# Patient Record
Sex: FEMALE | Race: BLACK OR AFRICAN AMERICAN | NOT HISPANIC OR LATINO | URBAN - METROPOLITAN AREA
[De-identification: names, ages, dates, MRNs, and addresses within clinical notes are randomized per-mention and may not be internally consistent; named-entity substitution may affect disease eponyms.]

---

## 2018-04-18 ENCOUNTER — RECORDS - HEALTHEAST (OUTPATIENT)
Dept: LAB | Facility: CLINIC | Age: 68
End: 2018-04-18

## 2018-04-18 LAB
ERYTHROCYTE [DISTWIDTH] IN BLOOD BY AUTOMATED COUNT: 14.9 % (ref 11–14.5)
HCT VFR BLD AUTO: 38.5 % (ref 35–47)
HGB BLD-MCNC: 12.8 G/DL (ref 12–16)
MCH RBC QN AUTO: 34.8 PG (ref 27–34)
MCHC RBC AUTO-ENTMCNC: 33.2 G/DL (ref 32–36)
MCV RBC AUTO: 105 FL (ref 80–100)
PLATELET # BLD AUTO: 97 THOU/UL (ref 140–440)
PMV BLD AUTO: 11.6 FL (ref 8.5–12.5)
RBC # BLD AUTO: 3.68 MILL/UL (ref 3.8–5.4)
WBC: 6.3 THOU/UL (ref 4–11)

## 2018-05-07 ENCOUNTER — AMBULATORY - HEALTHEAST (OUTPATIENT)
Dept: ADMINISTRATIVE | Facility: CLINIC | Age: 68
End: 2018-05-07

## 2018-05-07 ENCOUNTER — RECORDS - HEALTHEAST (OUTPATIENT)
Dept: LAB | Facility: CLINIC | Age: 68
End: 2018-05-07

## 2018-05-07 RX ORDER — DEXAMETHASONE 1.5 MG/1
2 TABLET ORAL DAILY
Status: SHIPPED | COMMUNITY
Start: 2018-05-07

## 2018-05-07 RX ORDER — FAMOTIDINE 20 MG/1
20 TABLET, FILM COATED ORAL 2 TIMES DAILY
Status: SHIPPED | COMMUNITY
Start: 2018-05-07

## 2018-05-07 RX ORDER — AMLODIPINE BESYLATE 5 MG/1
5 TABLET ORAL DAILY
Status: SHIPPED | COMMUNITY
Start: 2018-05-07

## 2018-05-07 RX ORDER — ACETAMINOPHEN 325 MG/1
650 TABLET ORAL EVERY 6 HOURS PRN
Status: SHIPPED | COMMUNITY
Start: 2018-05-07

## 2018-05-07 RX ORDER — INSULIN GLARGINE 100 [IU]/ML
16 INJECTION, SOLUTION SUBCUTANEOUS AT BEDTIME
Status: SHIPPED | COMMUNITY
Start: 2018-05-07

## 2018-05-07 RX ORDER — ATORVASTATIN CALCIUM 20 MG/1
20 TABLET, FILM COATED ORAL AT BEDTIME
Status: SHIPPED | COMMUNITY
Start: 2018-05-07

## 2018-05-07 RX ORDER — LEVETIRACETAM 750 MG/1
750 TABLET ORAL 2 TIMES DAILY
Status: SHIPPED | COMMUNITY
Start: 2018-05-07

## 2018-05-07 RX ORDER — LIDOCAINE/PRILOCAINE 2.5 %-2.5%
CREAM (GRAM) TOPICAL PRN
Status: SHIPPED | COMMUNITY
Start: 2018-05-07

## 2018-05-07 RX ORDER — AMOXICILLIN 250 MG
1 CAPSULE ORAL 2 TIMES DAILY
Status: SHIPPED | COMMUNITY
Start: 2018-05-07

## 2018-05-07 RX ORDER — LANOLIN ALCOHOL/MO/W.PET/CERES
3 CREAM (GRAM) TOPICAL AT BEDTIME
Status: SHIPPED | COMMUNITY
Start: 2018-05-07

## 2018-05-08 ENCOUNTER — OFFICE VISIT - HEALTHEAST (OUTPATIENT)
Dept: GERIATRICS | Facility: CLINIC | Age: 68
End: 2018-05-08

## 2018-05-08 DIAGNOSIS — I48.91 A-FIB (H): ICD-10-CM

## 2018-05-08 DIAGNOSIS — E11.9 DM (DIABETES MELLITUS) (H): ICD-10-CM

## 2018-05-08 DIAGNOSIS — I10 HTN (HYPERTENSION): ICD-10-CM

## 2018-05-08 DIAGNOSIS — C71.9 GBM (GLIOBLASTOMA MULTIFORME) (H): ICD-10-CM

## 2018-05-08 DIAGNOSIS — E46 PROTEIN CALORIE MALNUTRITION (H): ICD-10-CM

## 2018-05-08 DIAGNOSIS — I26.99 MULTIPLE PULMONARY EMBOLI (H): ICD-10-CM

## 2018-05-08 DIAGNOSIS — I26.99 PULMONARY INFARCT (H): ICD-10-CM

## 2018-05-08 DIAGNOSIS — E83.42 HYPOMAGNESEMIA: ICD-10-CM

## 2018-05-08 DIAGNOSIS — E87.6 HYPOKALEMIA: ICD-10-CM

## 2018-05-08 DIAGNOSIS — D69.6 THROMBOCYTOPENIA (H): ICD-10-CM

## 2018-05-08 LAB
ALBUMIN SERPL-MCNC: 1.9 G/DL (ref 3.5–5)
ALP SERPL-CCNC: 103 U/L (ref 45–120)
ALT SERPL W P-5'-P-CCNC: 36 U/L (ref 0–45)
ANION GAP SERPL CALCULATED.3IONS-SCNC: 7 MMOL/L (ref 5–18)
AST SERPL W P-5'-P-CCNC: 12 U/L (ref 0–40)
BILIRUB DIRECT SERPL-MCNC: 0.3 MG/DL
BILIRUB SERPL-MCNC: 0.6 MG/DL (ref 0–1)
BUN SERPL-MCNC: 11 MG/DL (ref 8–22)
CALCIUM SERPL-MCNC: 8.4 MG/DL (ref 8.5–10.5)
CHLORIDE BLD-SCNC: 107 MMOL/L (ref 98–107)
CO2 SERPL-SCNC: 27 MMOL/L (ref 22–31)
CREAT SERPL-MCNC: 0.46 MG/DL (ref 0.6–1.1)
ERYTHROCYTE [DISTWIDTH] IN BLOOD BY AUTOMATED COUNT: 16 % (ref 11–14.5)
GFR SERPL CREATININE-BSD FRML MDRD: >60 ML/MIN/1.73M2
GLUCOSE BLD-MCNC: 140 MG/DL (ref 70–125)
HCT VFR BLD AUTO: 33.6 % (ref 35–47)
HGB BLD-MCNC: 10.8 G/DL (ref 12–16)
MAGNESIUM SERPL-MCNC: 1.3 MG/DL (ref 1.8–2.6)
MCH RBC QN AUTO: 34.7 PG (ref 27–34)
MCHC RBC AUTO-ENTMCNC: 32.1 G/DL (ref 32–36)
MCV RBC AUTO: 108 FL (ref 80–100)
PLATELET # BLD AUTO: 136 THOU/UL (ref 140–440)
PMV BLD AUTO: 11.3 FL (ref 8.5–12.5)
POTASSIUM BLD-SCNC: 3.2 MMOL/L (ref 3.5–5)
PROT SERPL-MCNC: 4.8 G/DL (ref 6–8)
RBC # BLD AUTO: 3.11 MILL/UL (ref 3.8–5.4)
SODIUM SERPL-SCNC: 141 MMOL/L (ref 136–145)
WBC: 9.1 THOU/UL (ref 4–11)

## 2018-05-09 ENCOUNTER — RECORDS - HEALTHEAST (OUTPATIENT)
Dept: LAB | Facility: CLINIC | Age: 68
End: 2018-05-09

## 2018-05-10 LAB
ANION GAP SERPL CALCULATED.3IONS-SCNC: 6 MMOL/L (ref 5–18)
BUN SERPL-MCNC: 9 MG/DL (ref 8–22)
CALCIUM SERPL-MCNC: 8.3 MG/DL (ref 8.5–10.5)
CHLORIDE BLD-SCNC: 109 MMOL/L (ref 98–107)
CO2 SERPL-SCNC: 28 MMOL/L (ref 22–31)
CREAT SERPL-MCNC: 0.4 MG/DL (ref 0.6–1.1)
GFR SERPL CREATININE-BSD FRML MDRD: >60 ML/MIN/1.73M2
GLUCOSE BLD-MCNC: 67 MG/DL (ref 70–125)
MAGNESIUM SERPL-MCNC: 1.4 MG/DL (ref 1.8–2.6)
POTASSIUM BLD-SCNC: 3.5 MMOL/L (ref 3.5–5)
SODIUM SERPL-SCNC: 143 MMOL/L (ref 136–145)
UFH PPP CHRO-ACNC: 0.18 IU/ML (ref 0.3–0.7)

## 2018-05-11 ENCOUNTER — OFFICE VISIT - HEALTHEAST (OUTPATIENT)
Dept: GERIATRICS | Facility: CLINIC | Age: 68
End: 2018-05-11

## 2018-05-11 DIAGNOSIS — I26.99 MULTIPLE PULMONARY EMBOLI (H): ICD-10-CM

## 2018-05-11 DIAGNOSIS — I48.91 A-FIB (H): ICD-10-CM

## 2018-05-11 DIAGNOSIS — D69.6 THROMBOCYTOPENIA (H): ICD-10-CM

## 2018-05-11 DIAGNOSIS — C71.9 GBM (GLIOBLASTOMA MULTIFORME) (H): ICD-10-CM

## 2018-05-11 DIAGNOSIS — I10 HTN (HYPERTENSION): ICD-10-CM

## 2018-05-11 DIAGNOSIS — E83.42 HYPOMAGNESEMIA: ICD-10-CM

## 2018-05-11 DIAGNOSIS — I26.99 PULMONARY INFARCT (H): ICD-10-CM

## 2018-05-15 ENCOUNTER — OFFICE VISIT - HEALTHEAST (OUTPATIENT)
Dept: GERIATRICS | Facility: CLINIC | Age: 68
End: 2018-05-15

## 2018-05-15 DIAGNOSIS — I26.99 MULTIPLE PULMONARY EMBOLI (H): ICD-10-CM

## 2018-05-15 DIAGNOSIS — C71.9 GBM (GLIOBLASTOMA MULTIFORME) (H): ICD-10-CM

## 2018-05-15 DIAGNOSIS — I26.99 PULMONARY INFARCT (H): ICD-10-CM

## 2018-05-15 DIAGNOSIS — I48.91 A-FIB (H): ICD-10-CM

## 2018-05-16 ENCOUNTER — RECORDS - HEALTHEAST (OUTPATIENT)
Dept: LAB | Facility: CLINIC | Age: 68
End: 2018-05-16

## 2018-05-16 LAB
ERYTHROCYTE [DISTWIDTH] IN BLOOD BY AUTOMATED COUNT: 16.5 % (ref 11–14.5)
HCT VFR BLD AUTO: 33.6 % (ref 35–47)
HGB BLD-MCNC: 10.5 G/DL (ref 12–16)
MCH RBC QN AUTO: 34.9 PG (ref 27–34)
MCHC RBC AUTO-ENTMCNC: 31.3 G/DL (ref 32–36)
MCV RBC AUTO: 112 FL (ref 80–100)
PLATELET # BLD AUTO: 220 THOU/UL (ref 140–440)
PMV BLD AUTO: 10 FL (ref 8.5–12.5)
RBC # BLD AUTO: 3.01 MILL/UL (ref 3.8–5.4)
WBC: 8.4 THOU/UL (ref 4–11)

## 2018-05-17 ENCOUNTER — OFFICE VISIT - HEALTHEAST (OUTPATIENT)
Dept: GERIATRICS | Facility: CLINIC | Age: 68
End: 2018-05-17

## 2018-05-17 DIAGNOSIS — I10 HTN (HYPERTENSION): ICD-10-CM

## 2018-05-17 DIAGNOSIS — I26.99 MULTIPLE PULMONARY EMBOLI (H): ICD-10-CM

## 2018-05-17 DIAGNOSIS — D69.6 THROMBOCYTOPENIA (H): ICD-10-CM

## 2018-05-17 DIAGNOSIS — C71.9 GBM (GLIOBLASTOMA MULTIFORME) (H): ICD-10-CM

## 2018-05-21 ENCOUNTER — RECORDS - HEALTHEAST (OUTPATIENT)
Dept: LAB | Facility: CLINIC | Age: 68
End: 2018-05-21

## 2018-05-21 LAB — LMWH PPP CHRO-ACNC: 0.9 IU/ML (ref 0.4–1.1)

## 2018-05-22 ENCOUNTER — OFFICE VISIT - HEALTHEAST (OUTPATIENT)
Dept: GERIATRICS | Facility: CLINIC | Age: 68
End: 2018-05-22

## 2018-05-22 DIAGNOSIS — E09.9 STEROID-INDUCED DIABETES (H): ICD-10-CM

## 2018-05-22 DIAGNOSIS — T38.0X5A STEROID-INDUCED DIABETES (H): ICD-10-CM

## 2018-05-22 DIAGNOSIS — I26.99 MULTIPLE PULMONARY EMBOLI (H): ICD-10-CM

## 2018-05-22 DIAGNOSIS — C71.9 GBM (GLIOBLASTOMA MULTIFORME) (H): ICD-10-CM

## 2018-05-22 DIAGNOSIS — I10 HTN (HYPERTENSION): ICD-10-CM

## 2018-05-25 ENCOUNTER — RECORDS - HEALTHEAST (OUTPATIENT)
Dept: LAB | Facility: CLINIC | Age: 68
End: 2018-05-25

## 2018-05-25 ENCOUNTER — OFFICE VISIT - HEALTHEAST (OUTPATIENT)
Dept: GERIATRICS | Facility: CLINIC | Age: 68
End: 2018-05-25

## 2018-05-25 DIAGNOSIS — R00.0 TACHYCARDIA: ICD-10-CM

## 2018-05-25 DIAGNOSIS — D69.6 THROMBOCYTOPENIA (H): ICD-10-CM

## 2018-05-25 DIAGNOSIS — C71.9 GBM (GLIOBLASTOMA MULTIFORME) (H): ICD-10-CM

## 2018-05-25 DIAGNOSIS — I26.99 MULTIPLE PULMONARY EMBOLI (H): ICD-10-CM

## 2018-05-25 DIAGNOSIS — I10 HTN (HYPERTENSION): ICD-10-CM

## 2018-05-25 LAB
ALBUMIN UR-MCNC: NEGATIVE MG/DL
ANION GAP SERPL CALCULATED.3IONS-SCNC: 9 MMOL/L (ref 5–18)
APPEARANCE UR: CLEAR
BACTERIA #/AREA URNS HPF: ABNORMAL HPF
BILIRUB UR QL STRIP: NEGATIVE
BUN SERPL-MCNC: 7 MG/DL (ref 8–22)
CALCIUM SERPL-MCNC: 9.2 MG/DL (ref 8.5–10.5)
CAOX CRY #/AREA URNS HPF: PRESENT /[HPF]
CHLORIDE BLD-SCNC: 111 MMOL/L (ref 98–107)
CO2 SERPL-SCNC: 22 MMOL/L (ref 22–31)
COLOR UR AUTO: YELLOW
CREAT SERPL-MCNC: 0.56 MG/DL (ref 0.6–1.1)
ERYTHROCYTE [DISTWIDTH] IN BLOOD BY AUTOMATED COUNT: 16.1 % (ref 11–14.5)
GFR SERPL CREATININE-BSD FRML MDRD: >60 ML/MIN/1.73M2
GLUCOSE BLD-MCNC: 106 MG/DL (ref 70–125)
GLUCOSE UR STRIP-MCNC: NEGATIVE MG/DL
HCT VFR BLD AUTO: 36.4 % (ref 35–47)
HGB BLD-MCNC: 11.7 G/DL (ref 12–16)
HGB UR QL STRIP: NEGATIVE
KETONES UR STRIP-MCNC: NEGATIVE MG/DL
LEUKOCYTE ESTERASE UR QL STRIP: ABNORMAL
MCH RBC QN AUTO: 34.8 PG (ref 27–34)
MCHC RBC AUTO-ENTMCNC: 32.1 G/DL (ref 32–36)
MCV RBC AUTO: 108 FL (ref 80–100)
MUCOUS THREADS #/AREA URNS LPF: ABNORMAL LPF
NITRATE UR QL: NEGATIVE
PH UR STRIP: 5 [PH] (ref 4.5–8)
PLATELET # BLD AUTO: 194 THOU/UL (ref 140–440)
PMV BLD AUTO: 10 FL (ref 8.5–12.5)
POTASSIUM BLD-SCNC: 3.7 MMOL/L (ref 3.5–5)
RBC # BLD AUTO: 3.36 MILL/UL (ref 3.8–5.4)
RBC #/AREA URNS AUTO: ABNORMAL HPF
SODIUM SERPL-SCNC: 142 MMOL/L (ref 136–145)
SP GR UR STRIP: 1.01 (ref 1–1.03)
SQUAMOUS #/AREA URNS AUTO: ABNORMAL LPF
UROBILINOGEN UR STRIP-ACNC: ABNORMAL
WBC #/AREA URNS AUTO: ABNORMAL HPF
WBC CLUMPS #/AREA URNS HPF: PRESENT /[HPF]
WBC: 7.9 THOU/UL (ref 4–11)
YEAST #/AREA URNS HPF: ABNORMAL HPF

## 2018-05-28 LAB
BACTERIA SPEC CULT: ABNORMAL
BACTERIA SPEC CULT: ABNORMAL

## 2018-05-30 ENCOUNTER — OFFICE VISIT - HEALTHEAST (OUTPATIENT)
Dept: GERIATRICS | Facility: CLINIC | Age: 68
End: 2018-05-30

## 2018-05-30 DIAGNOSIS — C71.9 GBM (GLIOBLASTOMA MULTIFORME) (H): ICD-10-CM

## 2018-05-30 DIAGNOSIS — I48.91 A-FIB (H): ICD-10-CM

## 2018-05-30 DIAGNOSIS — I10 HTN (HYPERTENSION): ICD-10-CM

## 2018-05-30 DIAGNOSIS — I26.99 MULTIPLE PULMONARY EMBOLI (H): ICD-10-CM

## 2018-05-30 DIAGNOSIS — R00.0 TACHYCARDIA: ICD-10-CM

## 2018-05-30 DIAGNOSIS — T38.0X5A STEROID-INDUCED DIABETES (H): ICD-10-CM

## 2018-05-30 DIAGNOSIS — E09.9 STEROID-INDUCED DIABETES (H): ICD-10-CM

## 2018-05-30 DIAGNOSIS — N39.0 URINARY TRACT INFECTION: ICD-10-CM

## 2018-06-01 ENCOUNTER — OFFICE VISIT - HEALTHEAST (OUTPATIENT)
Dept: GERIATRICS | Facility: CLINIC | Age: 68
End: 2018-06-01

## 2018-06-01 DIAGNOSIS — I10 HYPERTENSION: ICD-10-CM

## 2018-06-01 DIAGNOSIS — R53.1 GENERAL WEAKNESS: ICD-10-CM

## 2018-06-01 DIAGNOSIS — K21.9 GERD (GASTROESOPHAGEAL REFLUX DISEASE): ICD-10-CM

## 2018-06-01 DIAGNOSIS — E11.9 DIABETES MELLITUS (H): ICD-10-CM

## 2018-06-01 DIAGNOSIS — E78.5 HYPERLIPIDEMIA: ICD-10-CM

## 2018-06-01 DIAGNOSIS — C71.9 GLIOBLASTOMA (H): ICD-10-CM

## 2018-06-05 ENCOUNTER — RECORDS - HEALTHEAST (OUTPATIENT)
Dept: LAB | Facility: CLINIC | Age: 68
End: 2018-06-05

## 2018-06-05 ENCOUNTER — OFFICE VISIT - HEALTHEAST (OUTPATIENT)
Dept: GERIATRICS | Facility: CLINIC | Age: 68
End: 2018-06-05

## 2018-06-05 DIAGNOSIS — E11.9 DIABETES MELLITUS (H): ICD-10-CM

## 2018-06-05 DIAGNOSIS — R53.81 PHYSICAL DEBILITY: ICD-10-CM

## 2018-06-05 DIAGNOSIS — I10 HYPERTENSION: ICD-10-CM

## 2018-06-05 DIAGNOSIS — C71.9 GLIOBLASTOMA (H): ICD-10-CM

## 2018-06-05 LAB
BASOPHILS # BLD AUTO: 0 THOU/UL (ref 0–0.2)
BASOPHILS NFR BLD AUTO: 0 % (ref 0–2)
EOSINOPHIL # BLD AUTO: 0 THOU/UL (ref 0–0.4)
EOSINOPHIL NFR BLD AUTO: 0 % (ref 0–6)
ERYTHROCYTE [DISTWIDTH] IN BLOOD BY AUTOMATED COUNT: 14.8 % (ref 11–14.5)
HCT VFR BLD AUTO: 32.6 % (ref 35–47)
HGB BLD-MCNC: 10.9 G/DL (ref 12–16)
LYMPHOCYTES # BLD AUTO: 2.1 THOU/UL (ref 0.8–4.4)
LYMPHOCYTES NFR BLD AUTO: 24 % (ref 20–40)
MCH RBC QN AUTO: 35 PG (ref 27–34)
MCHC RBC AUTO-ENTMCNC: 33.4 G/DL (ref 32–36)
MCV RBC AUTO: 105 FL (ref 80–100)
MONOCYTES # BLD AUTO: 1.3 THOU/UL (ref 0–0.9)
MONOCYTES NFR BLD AUTO: 15 % (ref 2–10)
NEUTROPHILS # BLD AUTO: 5.3 THOU/UL (ref 2–7.7)
NEUTROPHILS NFR BLD AUTO: 61 % (ref 50–70)
PLATELET # BLD AUTO: 296 THOU/UL (ref 140–440)
PMV BLD AUTO: 9.5 FL (ref 8.5–12.5)
RBC # BLD AUTO: 3.11 MILL/UL (ref 3.8–5.4)
WBC: 8.9 THOU/UL (ref 4–11)

## 2018-06-06 ENCOUNTER — RECORDS - HEALTHEAST (OUTPATIENT)
Dept: LAB | Facility: CLINIC | Age: 68
End: 2018-06-06

## 2018-06-06 LAB
ALBUMIN UR-MCNC: ABNORMAL MG/DL
APPEARANCE UR: CLEAR
BACTERIA #/AREA URNS HPF: ABNORMAL HPF
BILIRUB UR QL STRIP: NEGATIVE
COLOR UR AUTO: YELLOW
GLUCOSE UR STRIP-MCNC: NEGATIVE MG/DL
HGB UR QL STRIP: NEGATIVE
KETONES UR STRIP-MCNC: ABNORMAL MG/DL
LEUKOCYTE ESTERASE UR QL STRIP: ABNORMAL
MUCOUS THREADS #/AREA URNS LPF: ABNORMAL LPF
NITRATE UR QL: NEGATIVE
PH UR STRIP: 6 [PH] (ref 4.5–8)
RBC #/AREA URNS AUTO: ABNORMAL HPF
SP GR UR STRIP: 1.03 (ref 1–1.03)
SQUAMOUS #/AREA URNS AUTO: ABNORMAL LPF
UROBILINOGEN UR STRIP-ACNC: ABNORMAL
WBC #/AREA URNS AUTO: ABNORMAL HPF
YEAST #/AREA URNS HPF: ABNORMAL HPF

## 2018-06-08 ENCOUNTER — OFFICE VISIT - HEALTHEAST (OUTPATIENT)
Dept: GERIATRICS | Facility: CLINIC | Age: 68
End: 2018-06-08

## 2018-06-08 DIAGNOSIS — I10 HTN (HYPERTENSION): ICD-10-CM

## 2018-06-08 DIAGNOSIS — I48.91 A-FIB (H): ICD-10-CM

## 2018-06-08 DIAGNOSIS — E11.9 DM (DIABETES MELLITUS) (H): ICD-10-CM

## 2018-06-08 DIAGNOSIS — I26.99 PULMONARY INFARCT (H): ICD-10-CM

## 2018-06-08 LAB — BACTERIA SPEC CULT: ABNORMAL

## 2018-06-11 ENCOUNTER — AMBULATORY - HEALTHEAST (OUTPATIENT)
Dept: GERIATRICS | Facility: CLINIC | Age: 68
End: 2018-06-11

## 2021-06-16 PROBLEM — E11.9 DIABETES MELLITUS (H): Status: ACTIVE | Noted: 2018-06-01

## 2021-06-16 PROBLEM — E78.5 HYPERLIPIDEMIA: Status: ACTIVE | Noted: 2018-06-01

## 2021-06-16 PROBLEM — R53.1 GENERAL WEAKNESS: Status: ACTIVE | Noted: 2018-06-01

## 2021-06-16 PROBLEM — I10 HYPERTENSION: Status: ACTIVE | Noted: 2018-06-01

## 2021-06-16 PROBLEM — K21.9 GERD (GASTROESOPHAGEAL REFLUX DISEASE): Status: ACTIVE | Noted: 2018-06-01

## 2021-06-16 PROBLEM — C71.9 GLIOBLASTOMA (H): Status: ACTIVE | Noted: 2018-06-01

## 2021-06-17 NOTE — PROGRESS NOTES
[facility]    Facility:   [facility] [[id number]]   Code Status: FULL CODE      CHIEF COMPLAINT/REASON FOR VISIT:  Chief Complaint   Patient presents with     Review Of Multiple Medical Conditions       HISTORY:      HPI: Florence is a 67 y.o. female who I am seeing in follow-up of her multiple medical problems.  She is very nice 67-year-old female unfortunate individual who has stage IV GBM.  She has had multiple recurrences and has had resection of these recurrences the last of which was in January of this year.  Went to the hospital because of pulmonary infarct and multiple pulmonary embolism from the TCU.  Because of her very recent brain surgery and very high risk for bleeding, she was placed on a very conservative anticoagulation of Lovenox 60 mg subcu daily.  And anti-heparin level is being checked today and the results are reviewed.  It is below goal of 0.6-1 U/mL.    She gets intermittently confused.  [facility], but quickly changed her answer once a day for some clues.    She is now scheduled to follow-up with her neuro-oncologist Dr. Maxwell Barbour next Wednesday, May 17.  Able to move both arms freely however not by much with lower extremities.  She is still a total assist and dependent on Donna for transfers not able to ambulate maximal assist with dressing toileting and bathing.    She continues to take her Decadron as advised by her oncologist at 2 mg daily with her last day being today.  As a result her blood sugars continue to be uncontrolled except for fasting ones.  She did have 2 loose stools the day before yesterday 1 yesterday and none yet today.  This was associated with some mild abdominal discomfort no fevers    Appetite is better.  Sleeping better.  No headaches no visual disturbance.  No new weakness.    Past Medical History:   Diagnosis Date     Acidosis, metabolic      Acute embolism and thrombosis of axillary  vein      Allergic rhinitis      Atrial fibrillation      Brain tumor     glioma     Cataract      Claustrophobia      Cognitive communication deficit      Dizziness      DVT (deep vein thrombosis) in pregnancy      Dysmetabolic syndrome      Female climacteric state      GERD (gastroesophageal reflux disease)      Glioblastoma      Hyperlipidemia      Hypertension      Hypotension      Lichen planus      Malignant neoplasm of brain      Metabolic syndrome      Nausea      Osteopenia      Pain     LLQ     Pneumonia      Pulmonary embolism      Pulmonary infarct      PVD (peripheral vascular disease)      Refractive amblyopia      Sepsis      SOB (shortness of breath)      Spondylolisthesis      Thrombocytopenia      Type 2 diabetes mellitus     long term insulin use     Vitreous degeneration      Weakness              Family History   Problem Relation Age of Onset     Breast cancer Sister      Prostate cancer Brother      Breast cancer Sister      Kidney cancer Brother      Social History     Social History     Marital status: Single     Spouse name: N/A     Number of children: N/A     Years of education: N/A     Social History Main Topics     Smoking status: Former Smoker     Smokeless tobacco: Never Used      Comment: quit 30 years ago     Alcohol use Yes      Comment: infrequently     Drug use: Not on file     Sexual activity: Not on file     Other Topics Concern     Not on file     Social History Narrative     No narrative on file         Review of Systems  As above otherwise unremarkable  .There were no vitals filed for this visit.    Physical Exam  Vital signs noted.  Blood sugars are up except for fasting levels.  Patient is alert, awake, oriented to time, place and person, but only after cueing.  Not in acute cardiorespiratory distress  Skin: Warm, and moist,  no lesions,   Head: atraumatic, normocephalic,   Eyes: EOM's intact and conjugate, pink palpebral conjunctivae, anicteric sclerae, pupils  reactive  Lungs : Clear to auscultation , no crackles, wheezes or rhonchi  Heart : Nornal first and second heart sounds, No murmurs, heaves, or thrills  Abdomen: Soft, non tender, non distended, no hepatosplenomegaly, no ascites  Extremities: No edema, pulses are full and equal  Spontaneous movement of 4 extremities however they are globally weak.  Extremities more than the upper extremities    LABS:   Recent Results (from the past 72 hour(s))   Basic Metabolic Panel   Result Value Ref Range    Sodium 143 136 - 145 mmol/L    Potassium 3.5 3.5 - 5.0 mmol/L    Chloride 109 (H) 98 - 107 mmol/L    CO2 28 22 - 31 mmol/L    Anion Gap, Calculation 6 5 - 18 mmol/L    Glucose 67 (L) 70 - 125 mg/dL    Calcium 8.3 (L) 8.5 - 10.5 mg/dL    BUN 9 8 - 22 mg/dL    Creatinine 0.40 (L) 0.60 - 1.10 mg/dL    GFR MDRD Af Amer >60 >60 mL/min/1.73m2    GFR MDRD Non Af Amer >60 >60 mL/min/1.73m2   Magnesium   Result Value Ref Range    Magnesium 1.4 (L) 1.8 - 2.6 mg/dL   Anti-Xa Heparin Level   Result Value Ref Range    Anti-Xa Heparin Assay 0.18 (L) 0.30 - 0.70 IU/mL         ASSESSMENT:      ICD-10-CM    1. Multiple pulmonary emboli I26.99    2. Pulmonary infarct I26.99    3. Thrombocytopenia D69.6    4. GBM (glioblastoma multiforme) C71.9    5. A-fib I48.91    6. HTN (hypertension) I10    7. Uncontrolled diabetes mellitus E11.65    8. Hypomagnesemia E83.42        PLAN:    Continue anticoagulation.  Currently getting 60 mg of Lovenox.  I was initially planning on increasing it to 70 mg however I learned that she would get her Lovenox shots at around 8:00 and the lab person draws her blood a couple of hours before her dose so this makes the level inaccurate.  I will therefore stay on the current Lovenox 60 mg per day and watch closely.  She is not showing any signs of decline hypotension and hypoxia increased respiratory effort or fevers.  I will increase her mealtime insulin to 4 units at breakfast units for lunch and 6 units for dinner.   Continue with her Lantus 25 units at at bedtimeAlso continue insulin sliding scale as well as Novolin and at 14 units in the morning.  Replace magnesium.  He with physical therapy.  Follow-up with neuro-oncology next week  Platelet count has come up nicely..    Total 35 minutes of which 55% was spent counseling and coordination of care of the above plan.    Electronically signed by: Elif Cruz MD

## 2021-06-17 NOTE — PROGRESS NOTES
[facility]      Facility:    [facility] [[id number]]  Code Status: FULL CODE      Chief Complaint/Reason for Visit:  Chief Complaint   Patient presents with     H & P       HPI:   Florence is a 67 y.o. female who is being admitted to this transitional care unit on May 4, 2018 after being hospitalized at [facility] from April 27 - May 4.  She has a history of glioblastoma multiforme dating back 2002 per her report.  Stage IV she has had multiple recurrences from this disease, most recent resection was done in January of this year.  She sees Dr. Maxwell Campoverde neuro-oncologist and is currently on the Decadron taper    Was recently admitted to [facility] from April 9-13 because of very low platelet counts.  She was then found to have a right-sided DVT which was felt was causing consumptive process.  She was placed on Lovenox not the full dose but just a prophylactic dose because of recent brain surgery in January with residual hematoma.    Went to a transitional care unit however was readmitted back because of weakness tachycardia nonproductive cough and fever of greater than 102.    At that time she was also experiencing some diarrhea.    In the ER, she was noted to be in rapid atrial fibrillation.  Lactic acid with this very high at 8.  She was hypotensive.  She was placed in the ICU and fluid resuscitation began a total of 4.5 L of IV fluid and was also placed on vasopressors.  Digoxin was given to control the heart rate.    Workup revealed presence of multiple pulmonary emboli including the right main to mid lobar pulmonary artery and small right lower lobe segmental and subsegmental arteries there was also an evolving right lower lobe pulmonary infarct noted.  Furthermore,    She also had nearly occlusive thrombus in the right common femoral vein a partial occlusive thrombus in the right popliteal vein and posterior tibial vein and left popliteal  vein.    Considering the risks and the benefits of anticoagulation and her current clinical condition, and with the collaboration of different specialties, decision was made to start her on Lovenox at the low end of the therapeutic range.  Her platelet count was monitored very closely.  She did not need to have transfusion.  She has not been able to walk since her surgery in January and is wheelchair dependent.    As far as I could tell, at [facility], they were standing her up with the use of easy stand and uses 2 person assist      She used to live in her house with her 2 sisters she has 1 brother and 1 child.  She has several members of her family having cancer.    Denies any kind of pain.  She denies any headache.  She sounded a little confused when I talked to her.  When I asked her how her glioblastoma was discovered, she answered me she just had hypertension and they discovered the tumor.  I also asked her a few questions with which she answered quite inappropriately but other questions she was able to make sense of.    Her blood sugars have been going up especially at lunchtime and dinnertime.  She continues to take Decadron 2 mg once a day and to wait for Dr. Nathaniel Campoverde for further instructions.  She is on Keppra for seizure prophylaxis.  As far as her sugar is concerned, she takes Lantus 25 units at at bedtime in addition to Novolin and which he takes 14 units in the morning short acting Humalog 4 units at AC and sliding scale.  Denies any urinary symptoms denies any diarrhea.  Still coughing a little bit no further fevers no sputum production no chest pains no headache.    Today upon report with nursing staff and physical therapy, she has been needing a Donna lift for transfers.  Past Medical History:  Past Medical History:   Diagnosis Date     Acidosis, metabolic      Acute embolism and thrombosis of axillary vein      Allergic rhinitis      Atrial fibrillation      Brain tumor     glioma      Cataract      Claustrophobia      Cognitive communication deficit      Dizziness      DVT (deep vein thrombosis) in pregnancy      Dysmetabolic syndrome      Female climacteric state      GERD (gastroesophageal reflux disease)      Glioblastoma      Hyperlipidemia      Hypertension      Hypotension      Lichen planus      Malignant neoplasm of brain      Metabolic syndrome      Nausea      Osteopenia      Pain     LLQ     Pneumonia      Pulmonary embolism      Pulmonary infarct      PVD (peripheral vascular disease)      Refractive amblyopia      Sepsis      SOB (shortness of breath)      Spondylolisthesis      Thrombocytopenia      Type 2 diabetes mellitus     long term insulin use     Vitreous degeneration      Weakness            Surgical History:  Past Surgical History:   Procedure Laterality Date     brain tumor removal  01/04/2018    right crani for tumor resection     brain tumor removal  12/22/2016    right frontal     CATARACT EXTRACTION EXTRACAPSULAR W/ INTRAOCULAR LENS IMPLANTATION Right 08/22/2011     CHOLECYSTECTOMY       CRANIOTOMY  11/20/2012    tumor resection     CRANIOTOMY  02/15/2017    right cranial wound revision     CT pulmonary angiogram  04/27/2018     extracapsular cararact removal   05/23/2011    IOL left     IV glioblastoma multiforme s/p resection  01/2018     LAPAROSCOPIC CHOLECYSTECTOMY       left frontal craniectomy  07/19/2016     ANTHONY W/WO REMOVAL TUBE-OVARY  1986    myomas       Family History:   Family History   Problem Relation Age of Onset     Breast cancer Sister      Prostate cancer Brother      Breast cancer Sister      Kidney cancer Brother        Social History:    Social History     Social History     Marital status: Single     Spouse name: N/A     Number of children: N/A     Years of education: N/A     Social History Main Topics     Smoking status: Former Smoker     Smokeless tobacco: Never Used      Comment: quit 30 years ago     Alcohol use Yes      Comment: infrequently      Drug use: Not on file     Sexual activity: Not on file     Other Topics Concern     Not on file     Social History Narrative     No narrative on file          Review of Systems  On unremarkable  There were no vitals filed for this visit.    Physical Exam  Vital signs noted.  Patient is alert, awake, oriented to person, she thought that [facility] was [facility].  Not in acute cardiorespiratory distress, at times when I asked her questions she was answering with things that do not necessarily make perfect sense.  Skin: Warm, and moist,  no lesions,   Head: atraumatic, normocephalic, craniotomy scar noted  Eyes: EOM's intact and conjugate, pink palpebral conjunctivae, anicteric sclerae, pupils reactive  Lungs : Clear to auscultation , no crackles, wheezes or rhonchi  Heart : Nornal first and second heart sounds, No murmurs, heaves, or thrills  Abdomen: Soft, non tender, non distended, no hepatosplenomegaly, no ascites  Extremities: No edema, pulses are full and equal      Medication List:  Current Outpatient Prescriptions   Medication Sig     acetaminophen (TYLENOL) 325 MG tablet Take 650 mg by mouth every 6 (six) hours as needed for pain.     amLODIPine (NORVASC) 5 MG tablet Take 5 mg by mouth daily.     atorvastatin (LIPITOR) 20 MG tablet Take 20 mg by mouth at bedtime.     calcium-vitamin D 500 mg(1,250mg) -200 unit per tablet Take 1 tablet by mouth daily.     cholecalciferol, vitamin D3, 2,000 unit Tab Take 1 tablet by mouth daily.     dexamethasone (DECADRON) 1.5 MG tablet Take 2 mg by mouth daily.     enoxaparin (LOVENOX) 60 mg/0.6 mL syringe Inject 60 mg under the skin daily.     famotidine (PEPCID) 20 MG tablet Take 20 mg by mouth 2 (two) times a day.     insulin glargine (LANTUS) 100 unit/mL injection Inject 25 Units under the skin at bedtime.     insulin lispro (HUMALOG) 100 unit/mL injection Inject 4 Units under the skin 3 (three) times a day before meals.     insulin lispro (HUMALOG) 100 unit/mL  injection Inject under the skin at bedtime. Per sliding scale     insulin lispro (HUMALOG) 100 unit/mL injection Inject under the skin 3 (three) times a day before meals. Per sliding scale     insulin NPH (NOVOLIN N NPH U-100 INSULIN) 100 unit/mL injection Inject 14 Units under the skin daily.     levETIRAcetam (KEPPRA) 750 MG tablet Take 750 mg by mouth 2 (two) times a day.     lidocaine-prilocaine (EMLA) cream Apply topically as needed. Quarter size dollop to port site an hour before port access and cover with plastic wrap     melatonin 3 mg Tab tablet Take 3 mg by mouth at bedtime.     senna-docusate (SENNOSIDES-DOCUSATE SODIUM) 8.6-50 mg tablet Take 1 tablet by mouth 2 (two) times a day.       Labs:  Recent Results (from the past 72 hour(s))   Basic Metabolic Panel   Result Value Ref Range    Sodium 141 136 - 145 mmol/L    Potassium 3.2 (L) 3.5 - 5.0 mmol/L    Chloride 107 98 - 107 mmol/L    CO2 27 22 - 31 mmol/L    Anion Gap, Calculation 7 5 - 18 mmol/L    Glucose 140 (H) 70 - 125 mg/dL    Calcium 8.4 (L) 8.5 - 10.5 mg/dL    BUN 11 8 - 22 mg/dL    Creatinine 0.46 (L) 0.60 - 1.10 mg/dL    GFR MDRD Af Amer >60 >60 mL/min/1.73m2    GFR MDRD Non Af Amer >60 >60 mL/min/1.73m2   HM2(CBC w/o Differential)   Result Value Ref Range    WBC 9.1 4.0 - 11.0 thou/uL    RBC 3.11 (L) 3.80 - 5.40 mill/uL    Hemoglobin 10.8 (L) 12.0 - 16.0 g/dL    Hematocrit 33.6 (L) 35.0 - 47.0 %     (H) 80 - 100 fL    MCH 34.7 (H) 27.0 - 34.0 pg    MCHC 32.1 32.0 - 36.0 g/dL    RDW 16.0 (H) 11.0 - 14.5 %    Platelets 136 (L) 140 - 440 thou/uL    MPV 11.3 8.5 - 12.5 fL   Hepatic Profile   Result Value Ref Range    Bilirubin, Total 0.6 0.0 - 1.0 mg/dL    Bilirubin, Direct 0.3 <=0.5 mg/dL    Protein, Total 4.8 (L) 6.0 - 8.0 g/dL    Albumin 1.9 (L) 3.5 - 5.0 g/dL    Alkaline Phosphatase 103 45 - 120 U/L    AST 12 0 - 40 U/L    ALT 36 0 - 45 U/L   Magnesium   Result Value Ref Range    Magnesium 1.3 (L) 1.8 - 2.6 mg/dL         Assessment:     ICD-10-CM    1. Multiple pulmonary emboli I26.99    2. Pulmonary infarct I26.99    3. Thrombocytopenia D69.6    4. GBM (glioblastoma multiforme) C71.9    5. A-fib I48.91    6. HTN (hypertension) I10    7. DM (diabetes mellitus) E11.9    8. Hypokalemia E87.6    9. Hypomagnesemia E83.42    10. Protein calorie malnutrition E46        Plan:  Continue lovenox 60 mg sc daily, check anti XA to make sure that we are staying within the limits of 0.6-1 U/mL.  Adjust Humalog so that she is taking 6 units of short acting at lunchtime and dinnertime and maintain breakfast time to 4 units, in addition to current 25 units of Lantus and Novolin and 14 units in the morning plus sliding scale.  Continue with Decadron 2 mg daily.  I asked nursing staff to call oncology office to make an appointment within this week.  I am concerned  about her confusion although I do not entirely know her baseline, I am also concerned that in the hospital she was using the easy stand but now she has been having to use the Donna lift.  Nutrition to see regarding electrolyte disturbances.  Replace magnesium.  Watch for signs of bleeding/further confusion.  Total time spent was 60 minutes with more than 50% spent on counseling, discussion of treatment plan and extensive review of available records  This note has been dictated using voice recognition software. Any grammatical, typographical, or context distortions are unintentional.          Electronically signed by: Elif Cruz MD

## 2021-06-18 NOTE — PROGRESS NOTES
[facility]    Facility:   [facility] [[id number]]   Code Status: FULL CODE      CHIEF COMPLAINT/REASON FOR VISIT:  Chief Complaint   Patient presents with     Follow Up     rehab, dm, home soon       HISTORY:      HPI: Florence is a 67 y.o. female who I had a chance to revisit with secondary to her hospitalization April 27 - May 4, 2018 secondary to pulmonary infarction along with weakness was found to have a right mid lobar pulmonary artery and a small lower lobar segments and subsequent pulmonary artery PE with the right lower lobe pulmonary infarct.  Has a history of right lower extremity DVT along with chronic thrombocytopenia.  She also has a history of glioblastoma stage IV with resection January 2018.  Apparently will be going home soon on hospice.  We had a chance to address that.  Her appetite has been good.  She has been normotensive and afebrile and also on room air.  Her blood sugars ranging 71-1 30 in the morning in the p.m. 106-171 now decrease the Lantus 20 units at at bedtime and last week we decreased the Lantus from 25 down to 22 units at at bedtime.  Her pain seems to be well managed.  She seems to be very comfortable is getting extra house nutrient supplements.  Is on a number of vitamins and minerals.  No bowel or bladder problems.    Past Medical History:   Diagnosis Date     Acidosis, metabolic      Acute embolism and thrombosis of axillary vein      Allergic rhinitis      Atrial fibrillation      Brain tumor     glioma     Cataract      Claustrophobia      Cognitive communication deficit      Dizziness      DVT (deep vein thrombosis) in pregnancy      Dysmetabolic syndrome      Female climacteric state      GERD (gastroesophageal reflux disease)      Glioblastoma      Hyperlipidemia      Hypertension      Hypotension      Lichen planus      Malignant neoplasm of brain      Metabolic syndrome      Nausea      Osteopenia      Pain     LLQ      Pneumonia      Pulmonary embolism      Pulmonary infarct      PVD (peripheral vascular disease)      Refractive amblyopia      Sepsis      SOB (shortness of breath)      Spondylolisthesis      Thrombocytopenia      Type 2 diabetes mellitus     long term insulin use     Vitreous degeneration      Weakness              Family History   Problem Relation Age of Onset     Breast cancer Sister      Prostate cancer Brother      Breast cancer Sister      Kidney cancer Brother      Social History     Social History     Marital status: Single     Spouse name: N/A     Number of children: N/A     Years of education: N/A     Social History Main Topics     Smoking status: Former Smoker     Smokeless tobacco: Never Used      Comment: quit 30 years ago     Alcohol use Yes      Comment: infrequently     Drug use: Not on file     Sexual activity: Not on file     Other Topics Concern     Not on file     Social History Narrative     No narrative on file         Review of Systems  Currently no reports of fever chills fatigue cough or cold flulike symptoms nausea vomiting diarrhea dysuria rashes headache stiff neck or swollen glands.  History of glioblastoma DVT right lower extremity diabetes hypertension hyperlipidemia generalized weakness      Current Outpatient Prescriptions:      acetaminophen (TYLENOL) 325 MG tablet, Take 650 mg by mouth every 6 (six) hours as needed for pain., Disp: , Rfl:      amLODIPine (NORVASC) 5 MG tablet, Take 5 mg by mouth daily., Disp: , Rfl:      atorvastatin (LIPITOR) 20 MG tablet, Take 20 mg by mouth at bedtime., Disp: , Rfl:      calcium-vitamin D 500 mg(1,250mg) -200 unit per tablet, Take 1 tablet by mouth daily., Disp: , Rfl:      cholecalciferol, vitamin D3, 2,000 unit Tab, Take 1 tablet by mouth daily., Disp: , Rfl:      dexamethasone (DECADRON) 1.5 MG tablet, Take 2 mg by mouth daily., Disp: , Rfl:      famotidine (PEPCID) 20 MG tablet, Take 20 mg by mouth 2 (two) times a day., Disp: , Rfl:       insulin glargine (LANTUS) 100 unit/mL injection, Inject 20 Units under the skin at bedtime. , Disp: , Rfl:      insulin lispro (HUMALOG) 100 unit/mL injection, Inject 4 Units under the skin 3 (three) times a day before meals., Disp: , Rfl:      insulin lispro (HUMALOG) 100 unit/mL injection, Inject under the skin at bedtime. Per sliding scale, Disp: , Rfl:      insulin lispro (HUMALOG) 100 unit/mL injection, Inject under the skin 3 (three) times a day before meals. Per sliding scale, Disp: , Rfl:      insulin NPH (NOVOLIN N NPH U-100 INSULIN) 100 unit/mL injection, Inject 14 Units under the skin daily., Disp: , Rfl:      levETIRAcetam (KEPPRA) 750 MG tablet, Take 750 mg by mouth 2 (two) times a day., Disp: , Rfl:      lidocaine-prilocaine (EMLA) cream, Apply topically as needed. Quarter size dollop to port site an hour before port access and cover with plastic wrap, Disp: , Rfl:      melatonin 3 mg Tab tablet, Take 3 mg by mouth at bedtime., Disp: , Rfl:      senna-docusate (SENNOSIDES-DOCUSATE SODIUM) 8.6-50 mg tablet, Take 1 tablet by mouth 2 (two) times a day., Disp: , Rfl:     .There were no vitals filed for this visit.  Blood pressure 100/63 pulse 96 respirations 18 temperature 98.1  Physical Exam    Constitutional: No distress.   HENT: .   Eyes: Pupils are equal, round, and reactive to light.   Neck: Neck supple. No thyromegaly present.   Cardiovascular: Normal rate, regular rhythm and normal heart sounds.    History of hypertension   Pulmonary/Chest: Breath sounds normal.   History of pulmonary infarct right lower lobe   Abdominal: Bowel sounds are normal. There is no tenderness. There is no guarding.   Musculoskeletal:   Generalized weakness and debility.  DVT right lower extremity   Lymphadenopathy:     She has no cervical adenopathy.   Neurological: She is alert.   History of glioblastoma stage IV   Skin: Skin is warm and dry. No rash noted.   Psychiatric: Her behavior is normal.   LABS:   Lab Results    Component Value Date    WBC 7.9 05/25/2018    HGB 11.7 (L) 05/25/2018    HCT 36.4 05/25/2018     (H) 05/25/2018     05/25/2018         ASSESSMENT:      ICD-10-CM    1. Diabetes mellitus E11.9    2. Glioblastoma C71.9    3. Hypertension I10    4. Physical debility R53.81        PLAN:    Decreasing the Lantus to 20 units at at bedtime.  Continue to manage and follow.  Apparently will be discharging by the weekend and then going home and then obtaining hospice consultation.      Electronically signed by: Michael Duane Johnson, CNP

## 2021-06-18 NOTE — PROGRESS NOTES
[facility]    Facility:   [facility] [[id number]]   Code Status: FULL CODE      CHIEF COMPLAINT/REASON FOR VISIT:  Chief Complaint   Patient presents with     Review Of Multiple Medical Conditions       HISTORY:      HPI: Florence is a 67 y.o. female seen today in follow-up of her multiple issues.  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  She has resultant mild cognitive deficit.  Has recently seen oncology and recommended hospice.  She says that family is looking for a place for her.  Blood sugars has gotten a lot better since discontinuation of Decadron and adjustments of insulin regimen.  She was however under the impression that she continues to take her Decadron.  It has been in the order when she got discharged from the hospital, to continue Decadron for only 1 week.  She does not mind blood sugars being checked 4 times a day.  She also does not mind to continue getting her Lovenox shots for her multiple PEs.  Her anti-XA level was 0.9 which is within therapeutic range.          Past Medical History:   Diagnosis Date     Acidosis, metabolic      Acute embolism and thrombosis of axillary vein      Allergic rhinitis      Atrial fibrillation      Brain tumor     glioma     Cataract      Claustrophobia      Cognitive communication deficit      Dizziness      DVT (deep vein thrombosis) in pregnancy      Dysmetabolic syndrome      Female climacteric state      GERD (gastroesophageal reflux disease)      Glioblastoma      Hyperlipidemia      Hypertension      Hypotension      Lichen planus      Malignant neoplasm of brain      Metabolic syndrome      Nausea      Osteopenia      Pain     LLQ     Pneumonia      Pulmonary embolism      Pulmonary infarct      PVD (peripheral vascular disease)      Refractive amblyopia      Sepsis      SOB (shortness of breath)      Spondylolisthesis      Thrombocytopenia       Type 2 diabetes mellitus     long term insulin use     Vitreous degeneration      Weakness              Family History   Problem Relation Age of Onset     Breast cancer Sister      Prostate cancer Brother      Breast cancer Sister      Kidney cancer Brother      Social History     Social History     Marital status: Single     Spouse name: N/A     Number of children: N/A     Years of education: N/A     Social History Main Topics     Smoking status: Former Smoker     Smokeless tobacco: Never Used      Comment: quit 30 years ago     Alcohol use Yes      Comment: infrequently     Drug use: Not on file     Sexual activity: Not on file     Other Topics Concern     Not on file     Social History Narrative     No narrative on file         Review of Systems  Unremarkable  .There were no vitals filed for this visit.    Physical Exam  Vital signs noted.  Stable.  She does have some slowed perception of information and thought process.  Sometimes can get a little confused with these events and certainly things like her medications.  But for the most part she is appropriate  Patient is alert, awake,not in acute cardiorespiratory distress  Skin: Warm, and moist,  no lesions,   Head: atraumatic, normocephalic,   Eyes: EOM's intact and conjugate, pink palpebral conjunctivae, anicteric sclerae, pupils reactive  Lungs : Clear to auscultation , no crackles, wheezes or rhonchi  Heart : Nornal first and second heart sounds, No murmurs, heaves, or thrills  Abdomen: Soft, non tender, non distended, no hepatosplenomegaly, no ascites  Extremities: No edema, pulses are full and equal      LABS:   Recent Results (from the past 72 hour(s))   Anti-Xa Low Molecular Weight Heparin   Result Value Ref Range    Anti-Xa LMWH 0.9 0.4 - 1.1 IU/mL         ASSESSMENT:      ICD-10-CM    1. Multiple pulmonary emboli I26.99    2. GBM (glioblastoma multiforme) C71.9    3. 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 HTN (hypertension) I10        PLAN:     She has been able to scoot and move around using the wheelchair, however she needs to be coached and needs a lot of cueing.  Physical therapy will continue to work with her on this.  Work on dispositional going forward.    Hospice has been consulted by oncology continue current medication management.      Total 25 minutes of which 55% was spent counseling and coordination of care of the above plan.    Electronically signed by: Elif Cruz MD

## 2021-06-18 NOTE — PROGRESS NOTES
[facility]    Facility:   [facility] [[id number]]   Code Status: FULL CODE      CHIEF COMPLAINT/REASON FOR VISIT:  Chief Complaint   Patient presents with     Review Of Multiple Medical Conditions       HISTORY:      HPI: Florence is a 67 y.o. female seen today in ff up of her medical problems. RN staff brought up concerns for tachycardia.  This has been going on for the last few days.  Florence herself does not feel it.  And when further asked, she said that she in fact has always had high heart rates.  She is here recovering from multiple pulmonary embolism and infarct.  And since she had recently had brain surgery in January, she is on a very conservative dose of Lovenox 60 mg per day.  Her last anti-factor Xa level was right on target.    She also has lost the use of lower extremity since January.  Physical therapy is working with her.  Today she was in the therapy room and then they had her stand on the easy stand.  She tolerated a few seconds of those.    Bilateral legs were swollen.  However that it does not bother her.  Would not want to have anything else done such as Hussain socks or compressions    Denies any cough or denies any shortness of breath.  She denies any chest pain.  There has not been any fevers except for mild 98 and 99.1.    She does feel a little cold today.  Denies any abdominal pain  Has been having good bowel movements no urinary complaints either.  No headache no dizziness no visual symptoms.    Her mental status is about the same she does process things a little slower      Past Medical History:   Diagnosis Date     Acidosis, metabolic      Acute embolism and thrombosis of axillary vein      Allergic rhinitis      Atrial fibrillation      Brain tumor     glioma     Cataract      Claustrophobia      Cognitive communication deficit      Dizziness      DVT (deep vein thrombosis) in pregnancy      Dysmetabolic syndrome      Female climacteric state       GERD (gastroesophageal reflux disease)      Glioblastoma      Hyperlipidemia      Hypertension      Hypotension      Lichen planus      Malignant neoplasm of brain      Metabolic syndrome      Nausea      Osteopenia      Pain     LLQ     Pneumonia      Pulmonary embolism      Pulmonary infarct      PVD (peripheral vascular disease)      Refractive amblyopia      Sepsis      SOB (shortness of breath)      Spondylolisthesis      Thrombocytopenia      Type 2 diabetes mellitus     long term insulin use     Vitreous degeneration      Weakness              Family History   Problem Relation Age of Onset     Breast cancer Sister      Prostate cancer Brother      Breast cancer Sister      Kidney cancer Brother      Social History     Social History     Marital status: Single     Spouse name: N/A     Number of children: N/A     Years of education: N/A     Social History Main Topics     Smoking status: Former Smoker     Smokeless tobacco: Never Used      Comment: quit 30 years ago     Alcohol use Yes      Comment: infrequently     Drug use: Not on file     Sexual activity: Not on file     Other Topics Concern     Not on file     Social History Narrative     No narrative on file         Review of Systems  Unremarkable  .There were no vitals filed for this visit.    Physical Exam  Vital signs noted.  With therapy.  Physical therapy heart rate 123 on my examination.  It has gone up to 144 did not feel that she was having any difficulty and did not think that she was having a tough time breathing.  Blood pressure stays stable.  Patient is alert, awake, oriented to time, place and person, not in acute cardiorespiratory distress  Skin: Warm, and moist,  no lesions,   Head: atraumatic, normocephalic,   Eyes: EOM's intact and conjugate, pink palpebral conjunctivae, anicteric sclerae, pupils reactive  Lungs : Clear to auscultation , no crackles, wheezes or rhonchi  Heart : Nornal first and second heart sounds, No murmurs, heaves,  or thrills  Abdomen: Soft, non tender, non distended, no hepatosplenomegaly, no ascites  Extremities: No change in bipedal pitting edema, pulses are full and equal      LABS:   No results found for this or any previous visit (from the past 72 hour(s)).      ASSESSMENT:      ICD-10-CM    1. GBM (glioblastoma multiforme) C71.9    2. Multiple pulmonary emboli I26.99    3. Tachycardia R00.0    4. HTN (hypertension) I10    5. Thrombocytopenia D69.6        PLAN:    She has completed her Decadron.  Oncology had recommended hospice care eval family wants to initiate hospice upon transfer to long-term care.  Tachycardia might be secondary to overall disease process but she has been feeling quite cold as well we will check a CBC BMP and a urinalysis.  Lung sounds are clear she has been coughing no hypoxia  Tenure with Lovenox as patient requests continue insulin as well.    Total 25 minutes of which 55% was spent counseling and coordination of care of the above plan.    Electronically signed by: Elif Cruz MD

## 2021-06-18 NOTE — PROGRESS NOTES
[facility]    Facility:   [facility] [[id number]]   Code Status: FULL CODE  PCP: No Primary Care Provider   Phone: None   Fax: [phone]      CHIEF COMPLAINT/REASON FOR VISIT:  Chief Complaint   Patient presents with     Discharge Summary       HISTORY COURSE:  Florence is a 67 y.o. female who is being admitted to this transitional care unit on May 4, 2018 after being hospitalized at [facility] from April 27 - May 4.  She has a history of glioblastoma multiforme dating back 2002 per her report.  Stage IV she has had multiple recurrences from this disease, most recent resection was done in January of this year.  She sees Dr. Maxwell Campoverde neuro-oncologist and is currently on the Decadron taper     Was recently admitted to [facility] from April 9-13 because of very low platelet counts.  She was then found to have a right-sided DVT which was felt was causing consumptive process.  She was placed on Lovenox not the full dose but just a prophylactic dose because of recent brain surgery in January with residual hematoma.     Went to a transitional care unit however was readmitted back because of weakness tachycardia nonproductive cough and fever of greater than 102.     At that time she was also experiencing some diarrhea.     In the ER, she was noted to be in rapid atrial fibrillation.  Lactic acid with this very high at 8.  She was hypotensive.  She was placed in the ICU and fluid resuscitation began a total of 4.5 L of IV fluid and was also placed on vasopressors.  Digoxin was given to control the heart rate.     Workup revealed presence of multiple pulmonary emboli including the right main to mid lobar pulmonary artery and small right lower lobe segmental and subsegmental arteries there was also an evolving right lower lobe pulmonary infarct noted.  Furthermore,     She also had nearly occlusive thrombus in the right common femoral vein a partial occlusive thrombus  in the right popliteal vein and posterior tibial vein and left popliteal vein.     Considering the risks and the benefits of anticoagulation and her current clinical condition, and with the collaboration of different specialties, decision was made to start her on Lovenox at the low end of the therapeutic range.  Her platelet count was monitored very closely.  She did not need to have transfusion.  She has not been able to walk since her surgery in January and is wheelchair dependent.  Therapy has been limited due to her chronic medical conditions although they do feel that she now has been able to  Sufficiently participate with the therapy process and will be going home on hospice care.  We had a chance to discuss this in great detail today.  Very nice female.  I enjoyed visiting with her another visits as well.  She recently did have illness feeling earlier in the week the white cells did come back at 8.9 hemoglobin at 10.9 platelets 296.  The urinalysis was not convincing of any infection.  She is feeling much better now at this time.  Her blood sugars do continue to drop pretty much now less than 140.  Recently decrease the Lantus 20 units for now decrease the at bedtime Lantus 16 units.  The home care agency can also now continue to manage her sugars.  Her blood pressures have been okay she is sleeping well at night.  Getting extra house nutrient supplements.  She is on Xarelto and will now change the Xarelto to 20 mg daily rather than 15 mg twice daily due to her history of DVT as well as pulmonary infarct and A. fib.  Not having any discomfort.  No heartburn or reflux.  She did not have any further questions regarding her stay as well as her current medications.  Review of Systems  Currently no reports of fever chills fatigue cough or cold flulike symptoms nausea vomiting diarrhea dysuria rashes headache stiff neck or swollen glands.  History of glioblastoma DVT right lower extremity diabetes hypertension  hyperlipidemia generalized weakness     Vitals:    06/08/18 1039   BP: 104/58   Pulse: 90   Resp: 18   Temp: 98.8  F (37.1  C)   SpO2: 97%       Physical Exam    Constitutional: No distress.   HENT: .  Neck: Neck supple. No thyromegaly present.   Cardiovascular: Normal rate, regular rhythm and normal heart sounds.    History of hypertension   Pulmonary/Chest: Breath sounds normal.   History of pulmonary infarct right lower lobe   Abdominal: Bowel sounds are normal. There is no tenderness. There is no guarding.   Musculoskeletal:   Generalized weakness and debility.  DVT right lower extremity   Lymphadenopathy:     She has no cervical adenopathy.   Neurological: She is alert.   History of glioblastoma stage IV   Skin: Skin is warm and dry. No rash noted.   Psychiatric: Her behavior is normal.   Lab Results   Component Value Date    WBC 8.9 06/05/2018    HGB 10.9 (L) 06/05/2018    HCT 32.6 (L) 06/05/2018     (H) 06/05/2018     06/05/2018     Results for orders placed or performed in visit on 05/25/18   Basic Metabolic Panel   Result Value Ref Range    Sodium 142 136 - 145 mmol/L    Potassium 3.7 3.5 - 5.0 mmol/L    Chloride 111 (H) 98 - 107 mmol/L    CO2 22 22 - 31 mmol/L    Anion Gap, Calculation 9 5 - 18 mmol/L    Glucose 106 70 - 125 mg/dL    Calcium 9.2 8.5 - 10.5 mg/dL    BUN 7 (L) 8 - 22 mg/dL    Creatinine 0.56 (L) 0.60 - 1.10 mg/dL    GFR MDRD Af Amer >60 >60 mL/min/1.73m2    GFR MDRD Non Af Amer >60 >60 mL/min/1.73m2       No results found for: HGBA1C    MEDICATION LIST:  Current Outpatient Prescriptions   Medication Sig     rivaroxaban 20 mg Tab Take 20 mg by mouth daily with supper.     acetaminophen (TYLENOL) 325 MG tablet Take 650 mg by mouth every 6 (six) hours as needed for pain.     amLODIPine (NORVASC) 5 MG tablet Take 5 mg by mouth daily.     atorvastatin (LIPITOR) 20 MG tablet Take 20 mg by mouth at bedtime.     calcium-vitamin D 500 mg(1,250mg) -200 unit per tablet Take 1 tablet by  mouth daily.     cholecalciferol, vitamin D3, 2,000 unit Tab Take 1 tablet by mouth daily.     dexamethasone (DECADRON) 1.5 MG tablet Take 2 mg by mouth daily.     famotidine (PEPCID) 20 MG tablet Take 20 mg by mouth 2 (two) times a day.     insulin glargine (LANTUS) 100 unit/mL injection Inject 16 Units under the skin at bedtime.      insulin lispro (HUMALOG) 100 unit/mL injection Inject 4 Units under the skin 3 (three) times a day before meals.     insulin lispro (HUMALOG) 100 unit/mL injection Inject under the skin at bedtime. Per sliding scale     insulin lispro (HUMALOG) 100 unit/mL injection Inject under the skin 3 (three) times a day before meals. Per sliding scale     insulin NPH (NOVOLIN N NPH U-100 INSULIN) 100 unit/mL injection Inject 14 Units under the skin daily.     levETIRAcetam (KEPPRA) 750 MG tablet Take 750 mg by mouth 2 (two) times a day.     lidocaine-prilocaine (EMLA) cream Apply topically as needed. Quarter size dollop to port site an hour before port access and cover with plastic wrap     melatonin 3 mg Tab tablet Take 3 mg by mouth at bedtime.     senna-docusate (SENNOSIDES-DOCUSATE SODIUM) 8.6-50 mg tablet Take 1 tablet by mouth 2 (two) times a day.       DISCHARGE DIAGNOSIS:    ICD-10-CM    1. Pulmonary infarct I26.99    2. A-fib I48.91    3. HTN (hypertension) I10    4. DM (diabetes mellitus) E11.9        MEDICAL EQUIPMENT NEEDS:  none    DISCHARGE PLAN/FACE TO FACE:  I certify that services are/were furnished while this patient was under the care of a physician and that a physician or an allowed non-physician practitioner (NPP), had a face-to-face encounter that meets the physician face-to-face encounter requirements. The encounter was in whole, or in part, related to the primary reason for home health. The patient is confined to his/her home and needs intermittent skilled nursing, physical therapy, speech-language pathology, or the continued need for occupational therapy. A plan of care  has been established by a physician and is periodically reviewed by a physician.  Date of Face-to-Face Encounter: June 8, 2018    I certify that, based on my findings, the following services are medically necessary home health services: She will be discharging to home with current medications and will receive [facility] hospice care services    My clinical findings support the need for the above skilled services because: (Please write a brief narrative summary that describes what the RN, PT, SLP, or other services will be doing in the home. A list of diagnoses in this section does not meet the CMS requirements.)  She will require the [facility] hospice care services secondary to her chronic medical conditions including her glioblastoma stage IV with resection along with generalized weakness debility and medication management    This patient is homebound because: (Please write a brief narrative summary describing the functional limitations as to why this patient is homebound and specifically what makes this patient homebound.)  Secondary to her history of chronic medical conditions including glioblastoma stage IV along with generalized weakness pulmonary infarct A. fib hypertension diabetes and assistance with ADLs.    The patient is, or has been, under my care and I have initiated the establishment of the plan of care. This patient will be followed by a physician who will periodically review the plan of care.    Schedule follow up visit with primary care provider within 7 days to reestablish care.  She will follow-up with her primary care doctor regarding medication management and certainly follow-up with the [facility] hospice care team.  She does feel comfortable going home with the care team.  She did not have any further questions and is feeling much better than she did earlier in the week.    Discharge coordination care greater than 30 minutes.  Electronically signed by: Michael Duane Johnson,  CNP

## 2021-06-18 NOTE — PROGRESS NOTES
[facility]    Facility:   [facility] [[id number]]   Code Status: FULL CODE      CHIEF COMPLAINT/REASON FOR VISIT:  Chief Complaint   Patient presents with     Follow Up     rehab, dm,        HISTORY:      HPI: Florence is a 67 y.o. female who I had a chance to visit with secondary to her hospitalization June 27 to May 4, 2018 secondary to a pulmonary infarct along with weakness.  She was found to have a acute right main/mid lobar pulmonary artery was small lower lobar segment and subsequent pulmonary artery PE with a right lower lobe pulmonary infarct.  She also does have a history of right lower extremity DVT along with chronic thrombocytopenia gait instability history of glioblastoma stage IV along with resection from January 2018.  She does have a history of A. fib currently on Xarelto.  She does have a history of hypertension currently not being treated she is off her amlodipine but most of her systolic blood pressures less than 110.  Her blood sugars are starting to drop.  The morning ranging 72-1 40 6 in the -160 now we will decrease the Lantus 22 units rather than 25 units.  Recently started Cipro secondary to an apparent UTI.  She is getting Magic cup getting melatonin for sleep does have chronic edema.  Does need assistance with all ADLs.  No longer requiring oxygen so we will discontinue the oxygen.  Her appetite is only fair.  Does have a good sense of humor.    Past Medical History:   Diagnosis Date     Acidosis, metabolic      Acute embolism and thrombosis of axillary vein      Allergic rhinitis      Atrial fibrillation      Brain tumor     glioma     Cataract      Claustrophobia      Cognitive communication deficit      Dizziness      DVT (deep vein thrombosis) in pregnancy      Dysmetabolic syndrome      Female climacteric state      GERD (gastroesophageal reflux disease)      Glioblastoma      Hyperlipidemia      Hypertension      Hypotension       Lichen planus      Malignant neoplasm of brain      Metabolic syndrome      Nausea      Osteopenia      Pain     LLQ     Pneumonia      Pulmonary embolism      Pulmonary infarct      PVD (peripheral vascular disease)      Refractive amblyopia      Sepsis      SOB (shortness of breath)      Spondylolisthesis      Thrombocytopenia      Type 2 diabetes mellitus     long term insulin use     Vitreous degeneration      Weakness              Family History   Problem Relation Age of Onset     Breast cancer Sister      Prostate cancer Brother      Breast cancer Sister      Kidney cancer Brother      Social History     Social History     Marital status: Single     Spouse name: N/A     Number of children: N/A     Years of education: N/A     Social History Main Topics     Smoking status: Former Smoker     Smokeless tobacco: Never Used      Comment: quit 30 years ago     Alcohol use Yes      Comment: infrequently     Drug use: Not on file     Sexual activity: Not on file     Other Topics Concern     Not on file     Social History Narrative     No narrative on file         Review of Systems  Currently no reports of fever chills fatigue cough or cold flulike symptoms nausea vomiting diarrhea dysuria rashes headache stiff neck or swollen glands.  History of glioblastoma DVT right lower extremity diabetes hypertension hyperlipidemia generalized weakness  Current Outpatient Prescriptions   Medication Sig     acetaminophen (TYLENOL) 325 MG tablet Take 650 mg by mouth every 6 (six) hours as needed for pain.     amLODIPine (NORVASC) 5 MG tablet Take 5 mg by mouth daily.     atorvastatin (LIPITOR) 20 MG tablet Take 20 mg by mouth at bedtime.     calcium-vitamin D 500 mg(1,250mg) -200 unit per tablet Take 1 tablet by mouth daily.     cholecalciferol, vitamin D3, 2,000 unit Tab Take 1 tablet by mouth daily.     dexamethasone (DECADRON) 1.5 MG tablet Take 2 mg by mouth daily.     famotidine (PEPCID) 20 MG tablet Take 20 mg by mouth 2 (two)  times a day.     insulin glargine (LANTUS) 100 unit/mL injection Inject 22 Units under the skin at bedtime.      insulin lispro (HUMALOG) 100 unit/mL injection Inject 4 Units under the skin 3 (three) times a day before meals.     insulin lispro (HUMALOG) 100 unit/mL injection Inject under the skin at bedtime. Per sliding scale     insulin lispro (HUMALOG) 100 unit/mL injection Inject under the skin 3 (three) times a day before meals. Per sliding scale     insulin NPH (NOVOLIN N NPH U-100 INSULIN) 100 unit/mL injection Inject 14 Units under the skin daily.     levETIRAcetam (KEPPRA) 750 MG tablet Take 750 mg by mouth 2 (two) times a day.     lidocaine-prilocaine (EMLA) cream Apply topically as needed. Quarter size dollop to port site an hour before port access and cover with plastic wrap     melatonin 3 mg Tab tablet Take 3 mg by mouth at bedtime.     senna-docusate (SENNOSIDES-DOCUSATE SODIUM) 8.6-50 mg tablet Take 1 tablet by mouth 2 (two) times a day.       .There were no vitals filed for this visit.  Blood pressure 99/68 pulse 72 respirations 18 temperature 98.3 saturation room air 97%  Physical Exam   Constitutional: No distress.   HENT:   Head: Normocephalic.   Eyes: Pupils are equal, round, and reactive to light.   Neck: Neck supple. No thyromegaly present.   Cardiovascular: Normal rate, regular rhythm and normal heart sounds.    History of hypertension   Pulmonary/Chest: Breath sounds normal.   History of pulmonary infarct right lower lobe   Abdominal: Bowel sounds are normal. There is no tenderness. There is no guarding.   Musculoskeletal:   Generalized weakness and debility.  DVT right lower extremity   Lymphadenopathy:     She has no cervical adenopathy.   Neurological: She is alert.   History of glioblastoma stage IV   Skin: Skin is warm and dry. No rash noted.   Psychiatric: Her behavior is normal.         LABS:   Lab Results   Component Value Date    WBC 7.9 05/25/2018    HGB 11.7 (L) 05/25/2018    HCT  36.4 05/25/2018     (H) 05/25/2018     05/25/2018     Results for orders placed or performed in visit on 05/25/18   Basic Metabolic Panel   Result Value Ref Range    Sodium 142 136 - 145 mmol/L    Potassium 3.7 3.5 - 5.0 mmol/L    Chloride 111 (H) 98 - 107 mmol/L    CO2 22 22 - 31 mmol/L    Anion Gap, Calculation 9 5 - 18 mmol/L    Glucose 106 70 - 125 mg/dL    Calcium 9.2 8.5 - 10.5 mg/dL    BUN 7 (L) 8 - 22 mg/dL    Creatinine 0.56 (L) 0.60 - 1.10 mg/dL    GFR MDRD Af Amer >60 >60 mL/min/1.73m2    GFR MDRD Non Af Amer >60 >60 mL/min/1.73m2       No results found for: HGBA1C  Lab Results   Component Value Date    ALT 36 05/08/2018    AST 12 05/08/2018    ALKPHOS 103 05/08/2018    BILITOT 0.6 05/08/2018         ASSESSMENT:      ICD-10-CM    1. Glioblastoma C71.9    2. Diabetes mellitus E11.9    3. Hypertension I10    4. GERD (gastroesophageal reflux disease) K21.9    5. Hyperlipidemia E78.5    6. General weakness R53.1        PLAN:    Decreasing the Lantus 22 units rather than 25 units.  Discontinue the oxygen altogether.  Continue the cephalexin for the UTI.  She will also follow-up with oncology as previously arranged.  She otherwise is participating with the rehabilitation staff.        Electronically signed by: Michael Duane Johnson, CNP

## 2021-06-18 NOTE — PROGRESS NOTES
[facility]    [facility] [[id number]]   Code Status: FULL CODE      CHIEF COMPLAINT/REASON FOR VISIT:  Chief Complaint   Patient presents with     Review Of Multiple Medical Conditions       HISTORY:      HPI: Florence is a 67 y.o. female seen today in ff up of her multiple medical problems. Here after being hospitalized for pulmonary infarcts and emboli, at the background of stage 4 GBM, on Lovenox once a day 60 mg a very conservative approach because of her increased risk for intracranial bleeding, he is here for therapies and medical management.  She has not been able to use her lower extremities and the most that she had been able to do with therapy is stand for a few seconds on the standing frame.  She in fact has not been able to progress with therapy from last week.  She is needing 2 people to assist her with transfers and staff has been needing to use a Donna lift.  Family is aware and initially they wanted to move her to a long-term care facility however recently changed her mind and decided they wanted to take her home but she still needs a lot of things planned for her such as obtaining equipment necessary for transfers and just the extra hands to help her with the day today issues.  She will be needing a wheelchair at home as well.    Hospice has not been started family plans to start this when she reaches her final destination which at this point is home.  Today she is mildly confused.  She has been tachycardic for a few days now.  She tells me that she is always had tachycardia and she typically does not feel it.  Had run some tests of the other day secondary to her being in an immunocompromise state in trying to look for occult infection.  BMP is okay however urinalysis did show presence of bacteria pyuria and presence of E. coli 10-50,000 colony-forming units.    She however denies any symptoms pertaining to urinary tract infection.  She  denies any other symptoms today.  No fevers remains tachycardic    Past Medical History:   Diagnosis Date     Acidosis, metabolic      Acute embolism and thrombosis of axillary vein      Allergic rhinitis      Atrial fibrillation      Brain tumor     glioma     Cataract      Claustrophobia      Cognitive communication deficit      Dizziness      DVT (deep vein thrombosis) in pregnancy      Dysmetabolic syndrome      Female climacteric state      GERD (gastroesophageal reflux disease)      Glioblastoma      Hyperlipidemia      Hypertension      Hypotension      Lichen planus      Malignant neoplasm of brain      Metabolic syndrome      Nausea      Osteopenia      Pain     LLQ     Pneumonia      Pulmonary embolism      Pulmonary infarct      PVD (peripheral vascular disease)      Refractive amblyopia      Sepsis      SOB (shortness of breath)      Spondylolisthesis      Thrombocytopenia      Type 2 diabetes mellitus     long term insulin use     Vitreous degeneration      Weakness              Family History   Problem Relation Age of Onset     Breast cancer Sister      Prostate cancer Brother      Breast cancer Sister      Kidney cancer Brother      Social History     Social History     Marital status: Single     Spouse name: N/A     Number of children: N/A     Years of education: N/A     Social History Main Topics     Smoking status: Former Smoker     Smokeless tobacco: Never Used      Comment: quit 30 years ago     Alcohol use Yes      Comment: infrequently     Drug use: Not on file     Sexual activity: Not on file     Other Topics Concern     Not on file     Social History Narrative     No narrative on file         Review of Systems  She says no to my questions however review of systems is unreliable because of her current mental state she is confused.  .There were no vitals filed for this visit.    Physical Exam  Vital signs noted.  Heart rate running between 120s-130s.  With slight activity this could easily go  up to 140s  Patient is alert, awake, oriented to self person, not in acute cardiorespiratory distress  Skin: Warm, and moist,  no lesions,   Head: atraumatic, normocephalic,   Eyes: EOM's intact and conjugate, pink palpebral conjunctivae, anicteric sclerae, pupils reactive  Lungs : Clear to auscultation , no crackles, wheezes or rhonchi  Heart : Tachycardic, Nornal first and second heart sounds, No murmurs, heaves, or thrills  Abdomen: Soft, non tender, non distended, no hepatosplenomegaly, no ascites  Extremities: No change in bipedal edema, pulses are full and equal      LABS:   No results found for this or any previous visit (from the past 72 hour(s)).  .[email or web address]  Recent Results (from the past 168 hour(s))   Urinalysis   Result Value Ref Range    Color, UA Yellow Colorless, Yellow, Straw, Light Yellow    Clarity, UA Clear Clear    Glucose, UA Negative Negative    Bilirubin, UA Negative Negative    Ketones, UA Negative Negative    Specific Gravity, UA 1.013 1.001 - 1.030    Blood, UA Negative Negative    pH, UA 5.0 4.5 - 8.0    Protein, UA Negative Negative mg/dL    Urobilinogen, UA <2.0 E.U./dL <2.0 E.U./dL, 2.0 E.U./dL    Nitrite, UA Negative Negative    Leukocytes, UA Moderate (!) Negative    Bacteria, UA Few (!) None Seen hpf    RBC, UA 0-2 None Seen, 0-2 hpf    WBC, UA 10-25 (!) None Seen, 0-5 hpf    Squam Epithel, UA 0-5 None Seen, 0-5 lpf    WBC Clumps Present (!) None Seen    Yeast, UA Few (!) None Seen hpf    Mucus, UA Few (!) None Seen lpf    Ca Oxalate Renetta, UA Present (!) None Seen   Culture, Urine   Result Value Ref Range    Culture 10,000-50,000 col/ml Escherichia coli (!)     Culture 10,000-50,000 col/ml Candida glabrata (!)        Susceptibility    Escherichia coli - ROXY     Amoxicillin + Clavulanate 8/4 Sensitive      Ampicillin >16 Resistant      Cefazolin 2 Sensitive      Cefepime <=1 Sensitive      Ceftriaxone <=1 Sensitive      Ciprofloxacin <=0.5 Sensitive      Gentamicin <=2 Sensitive       Levofloxacin <=1 Sensitive      Meropenem <=0.25 Sensitive      Nitrofurantoin <=16 Sensitive      Tetracycline <=2 Sensitive      Tobramycin <=2 Sensitive      Trimethoprim + Sulfamethoxazole <=0.5 Sensitive    Basic Metabolic Panel   Result Value Ref Range    Sodium 142 136 - 145 mmol/L    Potassium 3.7 3.5 - 5.0 mmol/L    Chloride 111 (H) 98 - 107 mmol/L    CO2 22 22 - 31 mmol/L    Anion Gap, Calculation 9 5 - 18 mmol/L    Glucose 106 70 - 125 mg/dL    Calcium 9.2 8.5 - 10.5 mg/dL    BUN 7 (L) 8 - 22 mg/dL    Creatinine 0.56 (L) 0.60 - 1.10 mg/dL    GFR MDRD Af Amer >60 >60 mL/min/1.73m2    GFR MDRD Non Af Amer >60 >60 mL/min/1.73m2   HM2(CBC w/o Differential)   Result Value Ref Range    WBC 7.9 4.0 - 11.0 thou/uL    RBC 3.36 (L) 3.80 - 5.40 mill/uL    Hemoglobin 11.7 (L) 12.0 - 16.0 g/dL    Hematocrit 36.4 35.0 - 47.0 %     (H) 80 - 100 fL    MCH 34.8 (H) 27.0 - 34.0 pg    MCHC 32.1 32.0 - 36.0 g/dL    RDW 16.1 (H) 11.0 - 14.5 %    Platelets 194 140 - 440 thou/uL    MPV 10.0 8.5 - 12.5 fL         ASSESSMENT:      ICD-10-CM    1. GBM (glioblastoma multiforme) C71.9    2. Multiple pulmonary emboli I26.99    3. Tachycardia R00.0    4. Urinary tract infection N39.0    5. HTN (hypertension) I10    6. 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 A-fib I48.91        PLAN:    Physical therapy will pick a discharge date next Thursday.  We will have another care conference with the family.  She will need certain equipments so that family will be able to take care of her at home.  She will need a wheelchair to use within her home because of her limitation in mobility she is not able to transfer on her own and she is also not able to walk.  These are secondary to her glioblastoma progression.  She is unable to complete mobility related ADLs from standing and will not be able to safely complete mobility and mobility related ADLs with the use of a cane walker or crutch she has demonstrated her ability to propel a  wheelchair and she is willing to continue to use this wheelchair at home.  Additionally her home can accommodate this equipment.  She also needs a Donna lift and an electric hospital bed for ease of transfers.  I will start her on nitrofurantoin  For urinary tract infection with her being at a very high risk for serious infection because of her immunocompromised state.  Hospice will be started once they get home.  Blood sugars are under good control with current regimen and I have not adjusted her insulin regimen further since May 15.  Continue Keppra for seizure prophylaxis  Continue Lovenox 60 mg once a day.  Once she is formerly enrolled into hospice, we may elect to discontinue unnecessary medication/nonessential medication.  At this time there is still a possibility that family might not go through with hospice in the end.  Total 25 minutes of which 55% was spent counseling and coordination of care of the above plan.    Electronically signed by: Elif Cruz MD

## 2021-06-18 NOTE — PROGRESS NOTES
[facility]    Facility:   [facility] [[id number]]   Code Status: FULL CODE      CHIEF COMPLAINT/REASON FOR VISIT:  Chief Complaint   Patient presents with     Review Of Multiple Medical Conditions       HISTORY:      HPI: Florence is a 67 y.o. female who I am seeing for follow-up on her multiple medical problems.  Admitted secondary to multiple pulmonary embolism and pulmonary infarct, stage IV glioblastoma multiforme with multiple recurrences and resection in January, currently on Lovenox 60 mg daily.  Here for physical therapies.  She had seen Dr. Maxwell Campoverde her neuro-oncologist for a post discharge follow-up a few days ago, and they have discussed about her progressively declining health and I believe hospice has been discussed as well which was received well by the family.    She continues to take Decadron which makes her blood sugars go up.  And her numbers have been fluctuating quite a bit.  She remains very weak and unable to transfer on her own and unable to bear weight on her feet.  Staff have been using Donna to transfer.  Upper arm strength remains the same.  No worsening.  Mental status has stayed the same.    Denies any increased pain.  Denies any increasing weakness.    Appetite has been good.  Sleeping pretty well.  Her potassium and magnesium were low.  If these are being replaced.  Nutrition seeing her  To optimize intake.  As above        Past Medical History:   Diagnosis Date     Acidosis, metabolic      Acute embolism and thrombosis of axillary vein      Allergic rhinitis      Atrial fibrillation      Brain tumor     glioma     Cataract      Claustrophobia      Cognitive communication deficit      Dizziness      DVT (deep vein thrombosis) in pregnancy      Dysmetabolic syndrome      Female climacteric state      GERD (gastroesophageal reflux disease)      Glioblastoma      Hyperlipidemia      Hypertension      Hypotension      Lichen planus       Malignant neoplasm of brain      Metabolic syndrome      Nausea      Osteopenia      Pain     LLQ     Pneumonia      Pulmonary embolism      Pulmonary infarct      PVD (peripheral vascular disease)      Refractive amblyopia      Sepsis      SOB (shortness of breath)      Spondylolisthesis      Thrombocytopenia      Type 2 diabetes mellitus     long term insulin use     Vitreous degeneration      Weakness              Family History   Problem Relation Age of Onset     Breast cancer Sister      Prostate cancer Brother      Breast cancer Sister      Kidney cancer Brother      Social History     Social History     Marital status: Single     Spouse name: N/A     Number of children: N/A     Years of education: N/A     Social History Main Topics     Smoking status: Former Smoker     Smokeless tobacco: Never Used      Comment: quit 30 years ago     Alcohol use Yes      Comment: infrequently     Drug use: Not on file     Sexual activity: Not on file     Other Topics Concern     Not on file     Social History Narrative     No narrative on file         Review of Systems  As above  .There were no vitals filed for this visit.    Physical Exam  Vital signs noted.  Fasting blood sugars range from .  While lunchtime blood sugars are more stable between 119- 206 at bedtime blood sugars are in the upper 200s-300s.  Patient is alert, awake, oriented to time, place and person, not in acute cardiorespiratory distress  Skin: Warm, and moist,  no lesions,   Head: atraumatic, normocephalic, previous craniotomy scar noted.  Eyes: EOM's intact and conjugate, pink palpebral conjunctivae, anicteric sclerae, pupils reactive  Lungs : Clear to auscultation , no crackles, wheezes or rhonchi  Heart : Nornal first and second heart sounds, No murmurs, heaves, or thrills  Abdomen: Soft, non tender, non distended, no hepatosplenomegaly, no ascites  Extremities: No change in bipedal manual muscle testing on 4 extremities remain the same.  Edema,  pulses are full and equal very weak on the lower extremities about 2-3 out of 5 upper extremities are 4-5 out of 5.        LABS:   Recent Results (from the past 72 hour(s))   HM2(CBC w/o Differential)   Result Value Ref Range    WBC 8.4 4.0 - 11.0 thou/uL    RBC 3.01 (L) 3.80 - 5.40 mill/uL    Hemoglobin 10.5 (L) 12.0 - 16.0 g/dL    Hematocrit 33.6 (L) 35.0 - 47.0 %     (H) 80 - 100 fL    MCH 34.9 (H) 27.0 - 34.0 pg    MCHC 31.3 (L) 32.0 - 36.0 g/dL    RDW 16.5 (H) 11.0 - 14.5 %    Platelets 220 140 - 440 thou/uL    MPV 10.0 8.5 - 12.5 fL         ASSESSMENT:      ICD-10-CM    1. Multiple pulmonary emboli I26.99    2. GBM (glioblastoma multiforme) C71.9    3. Uncontrolled diabetes mellitus E11.65    4. HTN (hypertension) I10    5. Thrombocytopenia D69.6        PLAN:    Continue subcutaneous Lovenox.  We will attempt to coordinate anti-factor Xa level drawn 4 hours after her Lovenox shots as the previous level was not representative.  Her level was drawn before she was given the shot.  Continue to monitor mental functions in cognition signs and symptoms of intracranial bleeding.  Though she is interested in hospice, she is also interested in improving her blood sugars.  So I will further increase/adjust her short acting insulin.  Continue Keppra for seizure prophylaxis.  A care conference should be held to have family members sit down and talk to her providers to discuss next steps in her care, in line with their goal,           Total 25 minutes of which 55% was spent counseling and coordination of care of the above plan.    Electronically signed by: Elif Cruz MD

## 2021-06-18 NOTE — PROGRESS NOTES
[facility]    Facility:   [facility] [[id number]]   Code Status: FULL CODE      CHIEF COMPLAINT/REASON FOR VISIT:  Chief Complaint   Patient presents with     Review Of Multiple Medical Conditions       HISTORY:      HPI: Florence is a 67 y.o. female who I am seeing in follow-up of her multiple issues.  A very nice lady with glioblastoma more deformity with multiple recurrences status post resection in January 2018.  Came into the hospital with pulmonary infarct and several areas of pulmonary embolism currently on 60 mg of Lovenox once a day.  We did check an anti-heparin a level however this was not in conjunction with the timing of her administration of her Lovenox.  So this would make the level unreliable.    She has not had any acute changes in her sensorium.  She in fact has been clear in terms of the way she thinks in the way she communicates.    Really does not complain of any pain.  Denies any headache or dizziness.  Eating well.  She does sleep okay at night.    Blood sugars are still running high especially at dinnertime and at bedtime time.  She goes to see the oncologist this Friday.  No vomiting no fevers nursing staff notes good oral intake.      Past Medical History:   Diagnosis Date     Acidosis, metabolic      Acute embolism and thrombosis of axillary vein      Allergic rhinitis      Atrial fibrillation      Brain tumor     glioma     Cataract      Claustrophobia      Cognitive communication deficit      Dizziness      DVT (deep vein thrombosis) in pregnancy      Dysmetabolic syndrome      Female climacteric state      GERD (gastroesophageal reflux disease)      Glioblastoma      Hyperlipidemia      Hypertension      Hypotension      Lichen planus      Malignant neoplasm of brain      Metabolic syndrome      Nausea      Osteopenia      Pain     LLQ     Pneumonia      Pulmonary embolism      Pulmonary infarct      PVD (peripheral vascular disease)       Refractive amblyopia      Sepsis      SOB (shortness of breath)      Spondylolisthesis      Thrombocytopenia      Type 2 diabetes mellitus     long term insulin use     Vitreous degeneration      Weakness              Family History   Problem Relation Age of Onset     Breast cancer Sister      Prostate cancer Brother      Breast cancer Sister      Kidney cancer Brother      Social History     Social History     Marital status: Single     Spouse name: N/A     Number of children: N/A     Years of education: N/A     Social History Main Topics     Smoking status: Former Smoker     Smokeless tobacco: Never Used      Comment: quit 30 years ago     Alcohol use Yes      Comment: infrequently     Drug use: Not on file     Sexual activity: Not on file     Other Topics Concern     Not on file     Social History Narrative     No narrative on file         Review of Systems  As above  .There were no vitals filed for this visit.    Physical Exam  Vital signs noted.  Blood sugars good fasting  dinnertime it starts going high 177-303 at at bedtime 200s-300s  She is in atrial fibrillation.  Patient is alert, awake, oriented to time, place and person, not in acute cardiorespiratory distress  Skin: Warm, and moist,  no lesions,   Head: atraumatic, normocephalic,   Eyes: EOM's intact and conjugate, pink palpebral conjunctivae, anicteric sclerae, pupils reactive  Lungs : Clear to auscultation , no crackles, wheezes or rhonchi  Heart : Nornal first and second heart sounds, No murmurs, heaves, or thrills  Abdomen: Soft, non tender, non distended, no hepatosplenomegaly, no ascites  Extremities: No change in bipedal edema, pulses are full and equal        LABS:   No results found for this or any previous visit (from the past 72 hour(s)).  Recent Results (from the past 168 hour(s))   Basic Metabolic Panel   Result Value Ref Range    Sodium 143 136 - 145 mmol/L    Potassium 3.5 3.5 - 5.0 mmol/L    Chloride 109 (H) 98 - 107 mmol/L    CO2  28 22 - 31 mmol/L    Anion Gap, Calculation 6 5 - 18 mmol/L    Glucose 67 (L) 70 - 125 mg/dL    Calcium 8.3 (L) 8.5 - 10.5 mg/dL    BUN 9 8 - 22 mg/dL    Creatinine 0.40 (L) 0.60 - 1.10 mg/dL    GFR MDRD Af Amer >60 >60 mL/min/1.73m2    GFR MDRD Non Af Amer >60 >60 mL/min/1.73m2   Magnesium   Result Value Ref Range    Magnesium 1.4 (L) 1.8 - 2.6 mg/dL   Anti-Xa Heparin Level   Result Value Ref Range    Anti-Xa Heparin Assay 0.18 (L) 0.30 - 0.70 IU/mL         ASSESSMENT:      ICD-10-CM    1. Multiple pulmonary emboli I26.99    2. Pulmonary infarct I26.99    3. Uncontrolled diabetes mellitus E11.65    4. GBM (glioblastoma multiforme) C71.9    5. A-fib I48.91        PLAN:    Continue with current regimen.  Of Lovenox 60 mg daily.  Adjust Humalog so that she takes 6 units for breakfast 10 units for lunch and 6 units for dinner.  Continue with Lantus 25 at at bedtime and Novolin and 14 units in the a.m.    Remains on Decadron 2 mg daily   To follow-up with oncology.    Magnesium is being supplemented.    Check a CBC shortly.    Total 25 minutes of which 55% was spent counseling and coordination of care of the above plan.    Electronically signed by: Elif Cruz MD